# Patient Record
Sex: FEMALE | Race: WHITE | NOT HISPANIC OR LATINO | Employment: FULL TIME | URBAN - NONMETROPOLITAN AREA
[De-identification: names, ages, dates, MRNs, and addresses within clinical notes are randomized per-mention and may not be internally consistent; named-entity substitution may affect disease eponyms.]

---

## 2023-08-21 ENCOUNTER — OFFICE VISIT (OUTPATIENT)
Dept: PRIMARY CARE | Facility: CLINIC | Age: 43
End: 2023-08-21
Payer: COMMERCIAL

## 2023-08-21 VITALS
HEIGHT: 66 IN | SYSTOLIC BLOOD PRESSURE: 112 MMHG | HEART RATE: 84 BPM | BODY MASS INDEX: 24.75 KG/M2 | DIASTOLIC BLOOD PRESSURE: 64 MMHG | WEIGHT: 154 LBS

## 2023-08-21 DIAGNOSIS — K86.1 OTHER CHRONIC PANCREATITIS (MULTI): Primary | ICD-10-CM

## 2023-08-21 DIAGNOSIS — G89.29 CHRONIC RIGHT SHOULDER PAIN: ICD-10-CM

## 2023-08-21 DIAGNOSIS — J96.01 ACUTE RESPIRATORY FAILURE WITH HYPOXIA (MULTI): ICD-10-CM

## 2023-08-21 DIAGNOSIS — L98.9 SKIN LESION: ICD-10-CM

## 2023-08-21 DIAGNOSIS — M67.40 GANGLION CYST: ICD-10-CM

## 2023-08-21 DIAGNOSIS — M25.511 CHRONIC RIGHT SHOULDER PAIN: ICD-10-CM

## 2023-08-21 PROBLEM — E55.9 VITAMIN D DEFICIENCY: Status: ACTIVE | Noted: 2023-08-21

## 2023-08-21 PROBLEM — F41.9 ANXIETY: Status: ACTIVE | Noted: 2023-08-21

## 2023-08-21 PROBLEM — J45.20 MILD INTERMITTENT ASTHMA (HHS-HCC): Status: ACTIVE | Noted: 2023-08-21

## 2023-08-21 PROBLEM — K86.89 PANCREATIC DUCT STONES (HHS-HCC): Status: ACTIVE | Noted: 2023-08-21

## 2023-08-21 PROBLEM — E78.5 HYPERLIPIDEMIA: Status: ACTIVE | Noted: 2023-08-21

## 2023-08-21 PROBLEM — K21.9 GERD (GASTROESOPHAGEAL REFLUX DISEASE): Status: ACTIVE | Noted: 2023-08-21

## 2023-08-21 PROBLEM — D35.00 ADRENAL ADENOMA: Status: ACTIVE | Noted: 2023-08-21

## 2023-08-21 PROBLEM — F32.A DEPRESSION: Status: ACTIVE | Noted: 2023-08-21

## 2023-08-21 PROCEDURE — 4004F PT TOBACCO SCREEN RCVD TLK: CPT | Performed by: FAMILY MEDICINE

## 2023-08-21 PROCEDURE — 99213 OFFICE O/P EST LOW 20 MIN: CPT | Performed by: FAMILY MEDICINE

## 2023-08-21 RX ORDER — ASPIRIN 325 MG
TABLET, DELAYED RELEASE (ENTERIC COATED) ORAL
COMMUNITY
Start: 2020-12-09

## 2023-08-21 RX ORDER — IBUPROFEN 200 MG
TABLET ORAL EVERY 6 HOURS PRN
COMMUNITY

## 2023-08-21 RX ORDER — PANCRELIPASE 36000; 180000; 114000 [USP'U]/1; [USP'U]/1; [USP'U]/1
CAPSULE, DELAYED RELEASE PELLETS ORAL
COMMUNITY
Start: 2021-02-24

## 2023-08-21 RX ORDER — MULTIVITAMIN
TABLET ORAL
COMMUNITY
Start: 2020-12-09

## 2023-08-21 RX ORDER — LORATADINE 10 MG/1
1 TABLET ORAL DAILY
COMMUNITY

## 2023-08-21 RX ORDER — DICYCLOMINE HYDROCHLORIDE 10 MG/1
CAPSULE ORAL
COMMUNITY

## 2023-08-21 RX ORDER — ALBUTEROL SULFATE 90 UG/1
AEROSOL, METERED RESPIRATORY (INHALATION)
COMMUNITY
Start: 2022-09-16

## 2023-08-21 NOTE — PROGRESS NOTES
Subjective  Marjorie Escudero is a 43 y.o. female who presents for Hand Pain.  Hand Pain        Ivy here for acute visit with a few concerns.  Swelling on right index knuckle for 10 years, getting worse, tender, feels fluid filled.  Works as .  Right shoulder pain for years.  Swelling forehead for years.  Recommend routine check up at least annually,   Smoking cessation advised.  Review of Systems   All other systems reviewed and are negative.  .    Objective     Visit Vitals  /64 (BP Location: Left arm, Patient Position: Sitting)   Pulse 84      Physical Exam  HENT:      Head:      Comments: Swelling mid forehead 1 cm appears sub cu cyst  Musculoskeletal:      Comments: Right index mcp swelling over joint, ? Involved in joint, discoloration brownish   Neurological:      Mental Status: She is alert.         Assessment/Plan   Problem List Items Addressed This Visit       Other chronic pancreatitis (CMS/HCC) - Primary    Acute respiratory failure with hypoxia (CMS/HCC)     Other Visit Diagnoses       Ganglion cyst        Relevant Orders    Referral to Orthopaedic Surgery    Skin lesion        Relevant Orders    Referral to Dermatology    Chronic right shoulder pain        Relevant Orders    Referral to Orthopaedic Surgery                   Yue Wiggins MD

## 2023-09-08 ENCOUNTER — HOSPITAL ENCOUNTER (OUTPATIENT)
Dept: DATA CONVERSION | Facility: HOSPITAL | Age: 43
End: 2023-09-08
Attending: SPECIALIST | Admitting: SPECIALIST
Payer: COMMERCIAL

## 2023-09-08 DIAGNOSIS — M67.442 GANGLION, LEFT HAND: ICD-10-CM

## 2023-09-30 NOTE — H&P
History & Physical Reviewed:   Pregnant/Lactating:  ·  Are You Pregnant no   ·  Are You Currently Breastfeeding no     I have reviewed the History and Physical dated:  25-Aug-2023   History and Physical reviewed and relevant findings noted. Patient examined to review pertinent physical  findings.: No significant changes   Home Medications Reviewed: no changes noted   Allergies Reviewed: no changes noted     Consent:   COVID-19 Consent:  ·  COVID-19 Risk Consent Surgeon has reviewed key risks related to the risk of jo COVID-19 and if they contract COVID-19 what the risks are.       Electronic Signatures:  Narciso Barber)  (Signed 08-Sep-2023 12:25)   Authored: History & Physical Reviewed, Consent, Note  Completion      Last Updated: 08-Sep-2023 12:25 by Narciso Barber)

## 2023-10-01 NOTE — OP NOTE
PROCEDURE DETAILS    Preoperative Diagnosis:  Digital mucous cyst of finger of left hand, M67.442    Postoperative Diagnosis:  Digital mucous cyst of finger of left hand, M67.442    Surgeon: Narciso Barber  Resident/Fellow/Other Assistant: None of these were associated with this case    Procedure:  1. REMOVAL OF GANGLION CYST LEFT HAND INDEX FINGER    Anesthesia: Local  Estimated Blood Loss: Minimal  Findings: Ganglion cyst  Specimens(s) Collected: no,     Complications: None        Operative Report:   Indications:   This is a 43-year-old female with a cyst on the dorsum of the left hand over the index finger MCP joint.  An attempted aspiration in the office failed.  The patient wished to have the mass excised.  The procedure was explained along the risks, benefits  alternatives being reviewed.  Potential risks including but not limited to infection, neurovascular complication, recurrence of the cyst, pain as well as a potential need for reoperation were all discussed with the patient she consented to the procedure.    Operative procedure:  The patient was brought to the operative suite and placed in the supine position.  All bony promises were well-padded.  A tourniquet is placed on the left arm proximally over Webril.  At that point I locally prepped the index finger MP joint with an alcohol  prep.  Using the no touch technique I performed a field block anesthetizing around the cyst with a 50-50 mix of cord percent Marcaine plain 1% Xylocaine plain.  While the block was setting up the left hand and upper extremity was sterilely prepped with  ChloraPrep and sterilely draped in the usual manner.    I began by exsanguinating the left upper extremity and inflating the tourniquet to 250 mmHg.    I made a transverse incision over the cyst that was overlying the extensor tendon of the index finger metacarpal phalangeal joint.  Dissection was carried down carefully through the subcutaneous and fatty tissues.  I began  shelling out the mass.  I encountered  a gelatinous material consistent with a ganglion cyst.  I then carefully debrided around the cyst with a freer elevator as well as with a scissors.  After completely excising the cyst walls I inspected the extensor tendon.  There was no evidence of damage  to the extensor tendon or violation of the tendon sheath.  I was satisfied that the cyst was excised in total.  I then thoroughly copiously irrigated with normal saline.  The tourniquet was released.  Hemostasis was maintained with gentle pressure.  Attention  was turned to closure.    I reapproximated the wound edges with 3-0 nylon.  Sterile dressings were applied to consist of Xeroform 4 x 4's followed by sterile soft roll and an Ace.    The patient tolerated procedure well.    The patient was discharged from the operative suite and taken to the PACU in stable condition.                        Attestation:   Note Completion:  Attending Attestation I performed the procedure without a resident         Electronic Signatures:  Narciso Barber)  (Signed 08-Sep-2023 14:01)   Authored: Post-Operative Note, Chart Review, Note Completion      Last Updated: 08-Sep-2023 14:01 by Narciso Barber)

## 2025-08-05 ENCOUNTER — OFFICE VISIT (OUTPATIENT)
Age: 45
End: 2025-08-05
Payer: COMMERCIAL

## 2025-08-05 VITALS
BODY MASS INDEX: 23.46 KG/M2 | HEART RATE: 76 BPM | DIASTOLIC BLOOD PRESSURE: 70 MMHG | WEIGHT: 146 LBS | SYSTOLIC BLOOD PRESSURE: 120 MMHG | HEIGHT: 66 IN

## 2025-08-05 DIAGNOSIS — Z12.31 SCREENING MAMMOGRAM FOR BREAST CANCER: Primary | ICD-10-CM

## 2025-08-05 DIAGNOSIS — J45.20 MILD INTERMITTENT ASTHMA WITHOUT COMPLICATION (HHS-HCC): ICD-10-CM

## 2025-08-05 DIAGNOSIS — M65.4 DE QUERVAIN'S TENOSYNOVITIS, LEFT: ICD-10-CM

## 2025-08-05 DIAGNOSIS — K86.1 OTHER CHRONIC PANCREATITIS: ICD-10-CM

## 2025-08-05 PROBLEM — J96.01 ACUTE RESPIRATORY FAILURE WITH HYPOXIA: Status: RESOLVED | Noted: 2023-08-21 | Resolved: 2025-08-05

## 2025-08-05 PROCEDURE — 3008F BODY MASS INDEX DOCD: CPT | Performed by: FAMILY MEDICINE

## 2025-08-05 PROCEDURE — 99214 OFFICE O/P EST MOD 30 MIN: CPT | Performed by: FAMILY MEDICINE

## 2025-08-05 RX ORDER — HYDROXYZINE HYDROCHLORIDE 10 MG/1
TABLET, FILM COATED ORAL
COMMUNITY
Start: 2025-07-07

## 2025-08-05 RX ORDER — PREDNISONE 10 MG/1
TABLET ORAL
Qty: 30 TABLET | Refills: 0 | Status: SHIPPED | OUTPATIENT
Start: 2025-08-05

## 2025-08-05 RX ORDER — TRIAMCINOLONE ACETONIDE 1 MG/G
CREAM TOPICAL
COMMUNITY
Start: 2025-06-10

## 2025-08-05 ASSESSMENT — PATIENT HEALTH QUESTIONNAIRE - PHQ9
1. LITTLE INTEREST OR PLEASURE IN DOING THINGS: NOT AT ALL
2. FEELING DOWN, DEPRESSED OR HOPELESS: NOT AT ALL
SUM OF ALL RESPONSES TO PHQ9 QUESTIONS 1 AND 2: 0

## 2025-08-05 NOTE — PROGRESS NOTES
Left wrist pain and swelling; started yesterday; denies any trauma. States on 8/2 had all over joint pain with some nausea on 8/3.

## 2025-08-05 NOTE — PROGRESS NOTES
Subjective  Marjorie Escudero is a 45 y.o. female who presents for Wrist Pain.  HPI  Right hand dominant, cleans houses.  Left wrist pain since yesterday morning, no injury or trauma.  Pain based of thumb laterally into forearm.  History of chronic pancreatitis, chronic mild intermittent asthma, stable  Review of Systems   All other systems reviewed and are negative.  .    Allergies[1]    Medications Ordered Prior to Encounter[2]    Problem List[3]    Objective     Visit Vitals  /70 (BP Location: Right arm, Patient Position: Sitting)   Pulse 76      Physical Exam  Vitals reviewed.   HENT:      Head: Normocephalic.   Pulmonary:      Effort: Pulmonary effort is normal.     Musculoskeletal:      Comments: Tender mild swelling based of thumb lateraly into forearm, positive finkelstein, ROM full but with pain     Neurological:      Mental Status: She is alert.         Assessment/Plan   Problem List Items Addressed This Visit    None  Visit Diagnoses         Screening mammogram for breast cancer    -  Primary    Relevant Orders    BI mammo bilateral screening tomosynthesis      De Quervain's tenosynovitis, left        Relevant Medications    predniSONE (Deltasone) 10 mg tablet               Discussed brace, side effects, fup 2 weeks, call concerns.     Yue Wiggins MD          [1] No Known Allergies  [2]   Current Outpatient Medications on File Prior to Visit   Medication Sig Dispense Refill    cholecalciferol (Vitamin D-3) 1,250 mcg (50,000 unit) capsule Take by mouth.      dicyclomine (Bentyl) 10 mg capsule Take by mouth.      hydrOXYzine HCL (Atarax) 10 mg tablet TAKE 1 BY MOUTH EVERY DAY AT BEDTIME      ibuprofen 200 mg tablet Take by mouth every 6 hours if needed.      loratadine (Claritin) 10 mg tablet Take 1 tablet (10 mg) by mouth once daily.      multivitamin with folic acid (One Daily Multivitamin) 400 mcg tablet Take by mouth once daily.      pancrelipase, Lip-Prot-Amyl, (Creon) 36,000-114,000- 180,000  unit capsule,delayed release(DR/EC) capsule Take by mouth.      ProAir HFA 90 mcg/actuation inhaler INHALE 1-2 PUFFS EVERY 4-6 HOURS AS NEEDED FOR SHORTNESS OF BREATH, COUGH, OR WHEEZING      triamcinolone (Kenalog) 0.1 % cream USE TWICE DAILY UNTIL CLEAR THEN AS NEEDED       No current facility-administered medications on file prior to visit.   [3]   Patient Active Problem List  Diagnosis    Other chronic pancreatitis    Acute respiratory failure with hypoxia    Adrenal adenoma    Anxiety    Depression    GERD (gastroesophageal reflux disease)    Hyperlipidemia    Mild intermittent asthma    Pancreatic duct stones ([facility]-HCC)    Vitamin D deficiency

## 2025-08-18 ENCOUNTER — HOSPITAL ENCOUNTER (OUTPATIENT)
Dept: RADIOLOGY | Facility: CLINIC | Age: 45
Discharge: HOME | End: 2025-08-18
Payer: COMMERCIAL

## 2025-08-18 VITALS — HEIGHT: 66 IN | BODY MASS INDEX: 23.46 KG/M2 | WEIGHT: 145.99 LBS

## 2025-08-18 DIAGNOSIS — Z12.31 SCREENING MAMMOGRAM FOR BREAST CANCER: ICD-10-CM

## 2025-08-18 PROCEDURE — 77067 SCR MAMMO BI INCL CAD: CPT | Performed by: RADIOLOGY

## 2025-08-18 PROCEDURE — 77063 BREAST TOMOSYNTHESIS BI: CPT | Performed by: RADIOLOGY

## 2025-08-18 PROCEDURE — 77067 SCR MAMMO BI INCL CAD: CPT

## 2025-08-19 ENCOUNTER — APPOINTMENT (OUTPATIENT)
Age: 45
End: 2025-08-19
Payer: COMMERCIAL

## 2025-08-19 VITALS
SYSTOLIC BLOOD PRESSURE: 118 MMHG | BODY MASS INDEX: 23.66 KG/M2 | WEIGHT: 142 LBS | HEART RATE: 72 BPM | DIASTOLIC BLOOD PRESSURE: 64 MMHG | HEIGHT: 65 IN

## 2025-08-19 DIAGNOSIS — R23.2 HOT FLASHES: ICD-10-CM

## 2025-08-19 DIAGNOSIS — N92.6 IRREGULAR PERIODS: ICD-10-CM

## 2025-08-19 DIAGNOSIS — Z12.4 SCREENING FOR CERVICAL CANCER: Primary | ICD-10-CM

## 2025-08-19 DIAGNOSIS — M25.532 ACUTE PAIN OF LEFT WRIST: ICD-10-CM

## 2025-08-19 PROCEDURE — 3008F BODY MASS INDEX DOCD: CPT | Performed by: FAMILY MEDICINE

## 2025-08-19 PROCEDURE — 99214 OFFICE O/P EST MOD 30 MIN: CPT | Performed by: FAMILY MEDICINE

## 2025-08-20 LAB
ALBUMIN SERPL-MCNC: 4.4 G/DL (ref 3.6–5.1)
ALP SERPL-CCNC: 73 U/L (ref 31–125)
ALT SERPL-CCNC: 21 U/L (ref 6–29)
ANION GAP SERPL CALCULATED.4IONS-SCNC: 7 MMOL/L (CALC) (ref 7–17)
AST SERPL-CCNC: 12 U/L (ref 10–35)
B-HCG SERPL-ACNC: 13 MIU/ML
BASOPHILS # BLD AUTO: 55 CELLS/UL (ref 0–200)
BASOPHILS NFR BLD AUTO: 0.5 %
BILIRUB SERPL-MCNC: 0.5 MG/DL (ref 0.2–1.2)
BUN SERPL-MCNC: 12 MG/DL (ref 7–25)
CALCIUM SERPL-MCNC: 8.9 MG/DL (ref 8.6–10.2)
CHLORIDE SERPL-SCNC: 104 MMOL/L (ref 98–110)
CO2 SERPL-SCNC: 28 MMOL/L (ref 20–32)
CREAT SERPL-MCNC: 0.61 MG/DL (ref 0.5–0.99)
EGFRCR SERPLBLD CKD-EPI 2021: 112 ML/MIN/1.73M2
EOSINOPHIL # BLD AUTO: 143 CELLS/UL (ref 15–500)
EOSINOPHIL NFR BLD AUTO: 1.3 %
ERYTHROCYTE [DISTWIDTH] IN BLOOD BY AUTOMATED COUNT: 13.7 % (ref 11–15)
GLUCOSE SERPL-MCNC: 82 MG/DL (ref 65–139)
HCT VFR BLD AUTO: 39.4 % (ref 35–45)
HGB BLD-MCNC: 13 G/DL (ref 11.7–15.5)
LYMPHOCYTES # BLD AUTO: 2376 CELLS/UL (ref 850–3900)
LYMPHOCYTES NFR BLD AUTO: 21.6 %
MCH RBC QN AUTO: 33.4 PG (ref 27–33)
MCHC RBC AUTO-ENTMCNC: 33 G/DL (ref 32–36)
MCV RBC AUTO: 101.3 FL (ref 80–100)
MONOCYTES # BLD AUTO: 803 CELLS/UL (ref 200–950)
MONOCYTES NFR BLD AUTO: 7.3 %
NEUTROPHILS # BLD AUTO: 7623 CELLS/UL (ref 1500–7800)
NEUTROPHILS NFR BLD AUTO: 69.3 %
PLATELET # BLD AUTO: 244 THOUSAND/UL (ref 140–400)
PMV BLD REES-ECKER: 11.4 FL (ref 7.5–12.5)
POTASSIUM SERPL-SCNC: 4.2 MMOL/L (ref 3.5–5.3)
PROLACTIN SERPL-MCNC: 7.3 NG/ML
PROT SERPL-MCNC: 6.8 G/DL (ref 6.1–8.1)
RBC # BLD AUTO: 3.89 MILLION/UL (ref 3.8–5.1)
SODIUM SERPL-SCNC: 139 MMOL/L (ref 135–146)
TSH SERPL-ACNC: 1.47 MIU/L
WBC # BLD AUTO: 11 THOUSAND/UL (ref 3.8–10.8)

## 2025-08-21 ENCOUNTER — RESULTS FOLLOW-UP (OUTPATIENT)
Age: 45
End: 2025-08-21
Payer: COMMERCIAL

## 2025-08-22 ENCOUNTER — OFFICE VISIT (OUTPATIENT)
Facility: CLINIC | Age: 45
End: 2025-08-22
Payer: COMMERCIAL

## 2025-08-22 VITALS
SYSTOLIC BLOOD PRESSURE: 110 MMHG | HEIGHT: 65 IN | BODY MASS INDEX: 23.31 KG/M2 | DIASTOLIC BLOOD PRESSURE: 80 MMHG | WEIGHT: 139.9 LBS

## 2025-08-22 DIAGNOSIS — Z01.419 ENCOUNTER FOR GYNECOLOGICAL EXAMINATION WITHOUT ABNORMAL FINDING: Primary | ICD-10-CM

## 2025-08-22 DIAGNOSIS — Z12.4 SCREENING FOR CERVICAL CANCER: ICD-10-CM

## 2025-08-22 DIAGNOSIS — N91.2 AMENORRHEA: ICD-10-CM

## 2025-08-22 PROCEDURE — 99459 PELVIC EXAMINATION: CPT | Performed by: OBSTETRICS & GYNECOLOGY

## 2025-08-22 PROCEDURE — 99386 PREV VISIT NEW AGE 40-64: CPT | Performed by: OBSTETRICS & GYNECOLOGY

## 2025-08-22 PROCEDURE — 3008F BODY MASS INDEX DOCD: CPT | Performed by: OBSTETRICS & GYNECOLOGY

## 2025-08-22 ASSESSMENT — LIFESTYLE VARIABLES
SKIP TO QUESTIONS 9-10: 1
AUDIT-C TOTAL SCORE: 0
HOW OFTEN DO YOU HAVE A DRINK CONTAINING ALCOHOL: NEVER
HOW MANY STANDARD DRINKS CONTAINING ALCOHOL DO YOU HAVE ON A TYPICAL DAY: PATIENT DOES NOT DRINK
HOW OFTEN DO YOU HAVE SIX OR MORE DRINKS ON ONE OCCASION: NEVER

## 2025-08-22 ASSESSMENT — COLUMBIA-SUICIDE SEVERITY RATING SCALE - C-SSRS
6. HAVE YOU EVER DONE ANYTHING, STARTED TO DO ANYTHING, OR PREPARED TO DO ANYTHING TO END YOUR LIFE?: NO
1. IN THE PAST MONTH, HAVE YOU WISHED YOU WERE DEAD OR WISHED YOU COULD GO TO SLEEP AND NOT WAKE UP?: NO
2. HAVE YOU ACTUALLY HAD ANY THOUGHTS OF KILLING YOURSELF?: NO

## 2025-08-22 ASSESSMENT — ANXIETY QUESTIONNAIRES
1. FEELING NERVOUS, ANXIOUS, OR ON EDGE: NOT AT ALL
IF YOU CHECKED OFF ANY PROBLEMS ON THIS QUESTIONNAIRE, HOW DIFFICULT HAVE THESE PROBLEMS MADE IT FOR YOU TO DO YOUR WORK, TAKE CARE OF THINGS AT HOME, OR GET ALONG WITH OTHER PEOPLE: NOT DIFFICULT AT ALL
7. FEELING AFRAID AS IF SOMETHING AWFUL MIGHT HAPPEN: NOT AT ALL
6. BECOMING EASILY ANNOYED OR IRRITABLE: NOT AT ALL
2. NOT BEING ABLE TO STOP OR CONTROL WORRYING: NOT AT ALL
GAD7 TOTAL SCORE: 0
3. WORRYING TOO MUCH ABOUT DIFFERENT THINGS: NOT AT ALL
5. BEING SO RESTLESS THAT IT IS HARD TO SIT STILL: NOT AT ALL
4. TROUBLE RELAXING: NOT AT ALL

## 2025-08-22 ASSESSMENT — PAIN SCALES - GENERAL: PAINLEVEL_OUTOF10: 0-NO PAIN

## 2025-08-25 ENCOUNTER — TELEPHONE (OUTPATIENT)
Facility: CLINIC | Age: 45
End: 2025-08-25
Payer: COMMERCIAL

## 2025-08-26 DIAGNOSIS — N91.2 AMENORRHEA: Primary | ICD-10-CM

## 2025-08-26 LAB — B-HCG SERPL-ACNC: 11 MIU/ML

## 2025-09-03 DIAGNOSIS — N91.2 AMENORRHEA: Primary | ICD-10-CM

## 2025-09-03 LAB — B-HCG SERPL-ACNC: 9 MIU/ML

## 2025-09-19 ENCOUNTER — APPOINTMENT (OUTPATIENT)
Facility: CLINIC | Age: 45
End: 2025-09-19
Payer: COMMERCIAL

## 2026-08-20 ENCOUNTER — APPOINTMENT (OUTPATIENT)
Age: 46
End: 2026-08-20
Payer: COMMERCIAL

## 2026-08-25 ENCOUNTER — APPOINTMENT (OUTPATIENT)
Facility: CLINIC | Age: 46
End: 2026-08-25
Payer: COMMERCIAL